# Patient Record
Sex: MALE | Race: WHITE | NOT HISPANIC OR LATINO | Employment: FULL TIME | URBAN - METROPOLITAN AREA
[De-identification: names, ages, dates, MRNs, and addresses within clinical notes are randomized per-mention and may not be internally consistent; named-entity substitution may affect disease eponyms.]

---

## 2018-04-26 ENCOUNTER — OFFICE VISIT (OUTPATIENT)
Dept: PRIMARY CARE CLINIC | Facility: CLINIC | Age: 38
End: 2018-04-26
Payer: COMMERCIAL

## 2018-04-26 VITALS
RESPIRATION RATE: 18 BRPM | HEIGHT: 67 IN | BODY MASS INDEX: 35.16 KG/M2 | SYSTOLIC BLOOD PRESSURE: 137 MMHG | TEMPERATURE: 98 F | HEART RATE: 89 BPM | DIASTOLIC BLOOD PRESSURE: 84 MMHG | OXYGEN SATURATION: 97 % | WEIGHT: 224 LBS

## 2018-04-26 DIAGNOSIS — Z13.6 ENCOUNTER FOR SCREENING FOR CARDIOVASCULAR DISORDERS: ICD-10-CM

## 2018-04-26 DIAGNOSIS — Z00.00 ANNUAL PHYSICAL EXAM: Primary | ICD-10-CM

## 2018-04-26 LAB
BILIRUB SERPL-MCNC: NORMAL MG/DL
BLOOD URINE, POC: NORMAL
COLOR, POC UA: YELLOW
EKG 12-LEAD: NORMAL
GLUCOSE UR QL STRIP: NORMAL
KETONES UR QL STRIP: NORMAL
LEUKOCYTE ESTERASE URINE, POC: NORMAL
NITRITE, POC UA: NORMAL
PH, POC UA: 6
PROTEIN, POC: NORMAL
SPECIFIC GRAVITY, POC UA: 1
UROBILINOGEN, POC UA: 4

## 2018-04-26 PROCEDURE — 81001 URINALYSIS AUTO W/SCOPE: CPT | Mod: S$GLB,,, | Performed by: FAMILY MEDICINE

## 2018-04-26 PROCEDURE — 99999 PR PBB SHADOW E&M-NEW PATIENT-LVL III: CPT | Mod: PBBFAC,,, | Performed by: FAMILY MEDICINE

## 2018-04-26 PROCEDURE — 93000 ELECTROCARDIOGRAM COMPLETE: CPT | Mod: S$GLB,,, | Performed by: FAMILY MEDICINE

## 2018-04-26 PROCEDURE — 99395 PREV VISIT EST AGE 18-39: CPT | Mod: 25,S$GLB,, | Performed by: FAMILY MEDICINE

## 2018-07-31 PROBLEM — E78.00 HYPERCHOLESTEROLEMIA: Status: ACTIVE | Noted: 2018-07-31

## 2020-01-16 NOTE — PROGRESS NOTES
"Subjective:       Patient ID: Ricardo De La Cruz is a 37 y.o. male.    Chief Complaint: Annual Exam    Here for annual exam, due for labs (last done ~10 years ago). Cholesterol was a little elevated last time it was checked.  Generally feeling OK. Drinks 6-12 beers per weekend on Friday and Saturday nights. Smokes marijuana ~4 days/week. No other illicit substances. No routine meds.      Review of Systems   Constitutional: Negative for chills, fatigue and fever.   HENT: Negative for congestion.    Eyes: Negative for visual disturbance.   Respiratory: Negative for cough and shortness of breath.    Cardiovascular: Negative for chest pain.   Gastrointestinal: Negative for abdominal pain, nausea and vomiting.   Genitourinary: Negative for difficulty urinating.   Musculoskeletal: Negative for arthralgias.   Skin: Negative for rash.   Neurological: Negative for dizziness.   Psychiatric/Behavioral: Negative for sleep disturbance.       Objective:      Vitals:    04/26/18 1633   BP: 137/84   BP Location: Left arm   Patient Position: Sitting   BP Method: Large (Automatic)   Pulse: 89   Resp: 18   Temp: 98 °F (36.7 °C)   TempSrc: Oral   SpO2: 97%   Weight: 101.6 kg (224 lb)   Height: 5' 7" (1.702 m)     Physical Exam   Constitutional: He is oriented to person, place, and time. He appears well-developed and well-nourished.   HENT:   Head: Normocephalic and atraumatic.   Mouth/Throat: Oropharynx is clear and moist.   Eyes: EOM are normal. Pupils are equal, round, and reactive to light.   Neck: Neck supple. No JVD present. Carotid bruit is not present.   Cardiovascular: Normal rate, regular rhythm and normal heart sounds.    Pulses:       Radial pulses are 2+ on the right side, and 2+ on the left side.   Pulmonary/Chest: Effort normal and breath sounds normal.   Abdominal: Soft. Bowel sounds are normal. There is no tenderness.   Musculoskeletal: He exhibits no edema.   Neurological: He is alert and oriented to person, place, and " 2020       Charlie Jacobo MD  [address] [place] [address]  VIA In Basket      Patient: Joyce Hanson   YOB: 1946   Date of Visit: 2020       Dear Dr. Jacobo:    Thank you for referring Joyce Hanson to me for evaluation. Below are my notes for this visit with her.    If you have questions, please do not hesitate to call me. I look forward to following your patient along with you.      Sincerely,        Osiel Hernandez MD        CC: No Recipients  Yesica XOCHITL CastellanosZarate  2020  3:34 PM  Signed  Insurance Auth/Order for sleep study faxed to [facility].    Osiel Hernandez MD  2020  8:54 AM  Sign when Signing Visit  Cardiology Clinic Note: Osiel Hernandez MD, MPH     Referred by:   Primary care physician: Charlie Jacobo MD     Consultation for Joyce Hanson  : 1946      Subjective:   Joyce Hanson is a 73 year old woman with past medical history significant for CAD s/p stent, prior silent MI, HLD, HTN, and COPD who returns for follow up.     Ms. Hanson was admitted to the ED (2019) for acute on chronic respiratory failure with hypoxia and acute COPD exacerbation. Her symptoms improved with inhaler. Her blood pressure was elevated at the time. She presented no symptoms of heart failure, and troponin levels were normal. Chest x-ray was clear and EKG was normal.     At her last visit (2019), she had been experiencing SOB with minimal activity, such as walking around her house, which had remained relatively stable. This was not associated with chest pain. Additionally, she noted some peripheral edema. She otherwise denied any other cardiovascular symptoms. We planned to start her on aspirin and resume metoprolol succinate. An echocardiogram and coronary CTA were ordered.     Since her visit, she underwent a TTE, which demonstrated mild to moderately thickened LV likely due to hypertension, elevated pulmonary artery  pressures in the setting of COPD, and mildly dilated IVC indicating some mild volume overload which correlated with the mild lower extremity edema seen on exam. I referred her to pulmonology for COPD and pulmonary hypertension and instructed her to discontinue HCTZ and start chlorthalidone 25 mg antonio for mild   volume overload and uncontrolled HTN.     Today she presents with continuing shortness of breath similar to baseline attributed to her COPD. She has not been very mobile lately but she states that she does become short of breath while walking around her house. She is currently on home oxygen. She reports that due to some confusion she has not started aspirin or followed up with pulmonology for her COPD. She otherwise denies chest pain/heaviness, peripheral edema, orthopnea, PND, rapid weight gain, syncope/near syncope, and palpitations. Additionally, she continues to smoke. She presented quite strong resistance to smoking cessation during our counseling session, but eventually agreed on 02/18/2020 as a quit date. Recently she has suffered the death of a friend and as a result has developed some depressive moods.    Review of Systems   Constitutional: Negative for fatigue and unexpected weight change.   HENT: Negative for nosebleeds.    Respiratory: Negative for cough; + shortness of breath.    Cardiovascular: Negative for chest pain, palpitations.   Gastrointestinal: Negative for abdominal distention, abdominal pain and blood in stool.   Endocrine: Negative for polyuria.   Genitourinary: Negative for hematuria.   Musculoskeletal: Negative for myalgias.   Skin: Negative for pallor.   Neurological: Negative for syncope and light-headedness.   Hematological: Does not bruise/bleed easily.   Otherwise complete ROS is negative.      Past Medical History:   Diagnosis Date   • Abnormal stress test    • Acute sinusitis    • CAD (coronary artery disease)    • Cervical radiculopathy    • COPD (chronic obstructive  time.   Skin: Skin is warm and dry.   Psychiatric: He has a normal mood and affect. His behavior is normal.   Nursing note and vitals reviewed.      Assessment:       1. Annual physical exam    2. Encounter for screening for cardiovascular disorders        Plan:       Annual physical exam  -     CBC auto differential; Future; Expected date: 04/30/2018  -     Comprehensive metabolic panel; Future; Expected date: 04/30/2018  -     Lipid panel; Future; Expected date: 04/30/2018  -     POCT EKG 12-LEAD (NOT FOR [facility] USE)  -     POCT urinalysis, dipstick or tablet reag    Encounter for screening for cardiovascular disorders  Comments:  EKG normal  Orders:  -     Lipid panel; Future; Expected date: 04/30/2018  -     POCT EKG 12-LEAD (NOT FOR [facility] USE)             pulmonary disease) (CMS/HCC)    • HLD (hyperlipidemia)    • Hypertension    • Hypokalemia    • Old MI (myocardial infarction)     Silent   • Ventricular hypokinesis         Past Surgical History:   Procedure Laterality Date   • Cardiac catherization     • Colonoscopy          Family History   Problem Relation Age of Onset   • Heart disease Neg Hx         Social History     Tobacco Use   • Smoking status: Current Every Day Smoker     Packs/day: 0.50     Types: Cigarettes     Start date: 1961   • Smokeless tobacco: Current User   Substance Use Topics   • Alcohol use: Not Currently     Frequency: Never     Comment: social   • Drug use: Never        ALLERGIES:   Allergen Reactions   • Penicillins Other (See Comments)     unknown     Current Outpatient Medications   Medication Sig   • chlorthalidone (THALITONE) 25 MG tablet Take 1 tablet by mouth daily.   • traMADol (ULTRAM) 50 MG tablet Take 1 tablet by mouth 2 times daily as needed for Pain. Medication last prescribed by Natasha Núñez CNP   • losartan (COZAAR) 50 MG tablet Take 1 tablet by mouth daily.   • metoPROLOL succinate (TOPROL-XL) 25 MG 24 hr tablet Take 1 tablet by mouth daily.   • rosuvastatin (CRESTOR) 40 MG tablet Take 1 tablet by mouth daily.   • gabapentin (NEURONTIN) 300 MG capsule Take 1 capsule by mouth 3 times daily.   • Respiratory Therapy Supplies (NEBULIZER/ADULT MASK) Kit To use with albuterol for COPD Code: J44.9   • albuterol (VENTOLIN) (2.5 MG/3ML) 0.083% nebulizer solution Take 3 mLs by nebulization every 6 hours as needed for Wheezing or Shortness of Breath.   • aspirin 81 MG tablet Take 1 tablet by mouth daily.   • nicotine polacrilex (NICOTINE MINI) 4 MG lozenge Place 1 lozenge inside cheek as needed for Smoking cessation.   • varenicline (CHANTIX STARTING MONTH PAK) 0.5 MG X 11 & 1 MG X 42 tablet Days 1 to 3: 0.5 mg once daily; Days 4 to 7: 0.5 mg twice daily; Day 8 through the end of week 12: 1 mg twice daily   • varenicline  (CHANTIX CONTINUING MONTH DEEP) 1 MG tablet Take 1 tablet by mouth 2 times daily. Day 1 to 3: 0.5 mg once daily; Day 4 to 7: 0.5 mg twice daily; Day 8 through week 12: 1 mg twice daily   • methylPREDNISolone (MEDROL, DEEP,) 4 MG tablet Take 1 tablet by mouth as directed. follow package directions   • furosemide (LASIX) 20 MG tablet TAKE 1 TABLET BY MOUTH DAILY AS NEEDED FOR LOWER EXTREMITY EDEMA     No current facility-administered medications for this visit.         Objective:     Physical Exam:   Visit Vitals  /69 (BP Location: LUE - Left upper extremity, Patient Position: Sitting, Cuff Size: Regular)   Pulse 79   Temp 98.5 °F (36.9 °C) (Tympanic)   Ht 5' 2.01\" (1.575 m)   Wt 82.3 kg (181 lb 6.4 oz)   SpO2 (!) 89%   BMI 33.17 kg/m²     Wt Readings from Last 4 Encounters:   01/16/20 82.3 kg (181 lb 6.4 oz)   11/26/19 85.6 kg (188 lb 11.4 oz)   11/18/19 85.4 kg (188 lb 4.4 oz)   09/03/19 83.1 kg (183 lb 3.2 oz)     General: NAD  Eyes:  No arcus; No xanthelasma;  No ptosis; pupils equal in size; no icterus  Neck: No JVD sitting; carotid normal amplitude; normal cervical ROM  Respiratory: Symmetric chest expansion, No crackles, wheezes or dullness  Cardiovascular:  Rhythm regular; Normal S1, S2 split normal; No gallops, rubs or murmurs; No carotid bruits; DP pulses 2+ bilaterally  GI: Not distended, non-tender, BS active; no flank dullness  MSK: Muscle mass as expected for age   Extremities: No varicose veins or BLE edema.    Neurologic:  Alert, no tremor  Mood: Euthymic  Skin: Intact     Labs:  CHOLESTEROL (mg/dL)   Date Value   11/18/2019 157     HDL (mg/dL)   Date Value   11/18/2019 56     CHOL/HDL (no units)   Date Value   11/18/2019 2.8     TRIGLYCERIDE (mg/dL)   Date Value   11/18/2019 113     CALCULATED LDL (mg/dL)   Date Value   11/18/2019 78        Hemoglobin A1C (%)   Date Value   11/26/2019 6.3 (H)        WBC (K/mcL)   Date Value   08/23/2019 5.7     RBC (mil/mcL)   Date Value   08/23/2019 6.17 (H)      HCT (%)   Date Value   08/23/2019 56.7 (H)     HGB (g/dL)   Date Value   08/23/2019 17.8 (H)     PLT (K/mcL)   Date Value   08/23/2019 230        Sodium (mmol/L)   Date Value   11/26/2019 144     Potassium (mmol/L)   Date Value   11/26/2019 3.6     Chloride (mmol/L)   Date Value   11/26/2019 102     Glucose (mg/dL)   Date Value   11/26/2019 77     CALCIUM (mg/dL)   Date Value   11/26/2019 9.3     Carbon Dioxide (mmol/L)   Date Value   11/26/2019 33 (H)     BUN (mg/dL)   Date Value   11/26/2019 8     Creatinine (mg/dL)   Date Value   11/26/2019 0.72        AST/SGOT (Units/L)   Date Value   11/26/2019 13     ALT/SGPT (Units/L)   Date Value   11/26/2019 13     No results found for: GGTP  ALK PHOSPHATASE (Units/L)   Date Value   11/26/2019 102     TOTAL BILIRUBIN (mg/dL)   Date Value   11/26/2019 0.6        Imaging:    Cardiac Catheterization 03/21/2018:    LM:   20%.  LAD:   Medium-sized vessel.  Mild to moderate diffuse disease.  One large diagonal branch system.  Positive collaterals via septals to the distal RCA/PDA.    Cx:   Nondominant.  Giving rise to to OM system.  This vessels have mild to moderate nonobstructive disease.     RCA:   100% midportion.  Small vessel.   of the distal RCA PDA and PL branch.  The distal PDA and PL branches seen via collaterals through the septal perforators.  This distal system is small in size with mild to moderate diffuse disease.      Lv Function:   Not done.  Normal LVEDP.    Nuclear Stress Test 07/10/2018:  CONCLUSION  Abnormal myocardial perfusion scan suggestive of infarction of the inferobasal wall without evidence of reversible ischemia. Ejection fraction is low normal.     TTE 03/28/2019:  STUDY CONCLUSIONS  SUMMARY:  1. Left ventricle: The cavity size is normal. Wall thickness is at the     upper limits of normal. Systolic function is normal. The estimated ejection fraction is 70%, by biplane method of disks.  2. Right ventricle: The cavity size is normal. Wall  thickness is normal. Systolic function is normal.  3. Aortic valve: Transvalvular velocity is within the normal range. There is no stenosis. No regurgitation.  4. Mitral valve: The leaflets are mildly calcified. Transvalvular velocity is within the normal range. There is no evidence for stenosis. No regurgitation.  5. Tricuspid valve: No regurgitation.    EKG 1/28/2019:  Sinus rhythm.     TTE 11/21/2019:  STUDY CONCLUSIONS  SUMMARY:  1. Left ventricle: The cavity size is mildly reduced. Wall thickness is     mildly to moderately increased. Systolic function is normal. The     estimated ejection fraction is 55%, by visual assessment. Doppler     parameters are consistent with abnormal left ventricular relaxation     (grade 1 diastolic dysfunction).  2. Aortic root: The aortic root is mildly dilated and mildly calcified.  3. Right ventricle: The cavity size is mildly dilated. Wall thickness is     normal. Systolic function is mildly reduced. Systolic pressure is     moderately increased. The estimated peak pressure is 65mm Hg.  4. Inferior vena cava: The vessel is mildly dilated. The respirophasic     diameter changes are in the normal range (greater than or equal to     50%).  5. Pericardium, extracardiac: A trivial pericardial effusion is     identified. There is no evidence of hemodynamic compromise.    Coronary CTA 12/11/2019:  IMPRESSION:  1. Coronary artery disease with total RCA occlusion with collaterals from LAD and mild-moderate   LCA disease. An FFR assessment was sent and will be reported separately.  2. Coronary artery calcium score of 1287, which represents the 95th percentile adjusted for age,   gender and ethnicity.  3. CAD RADS 5 - preventative therapies should be implemented  4. LVEF of 51%.     The protocol was designed for imaging cardiovascular structures and is suboptimal for imaging of   other structures and organs. The lungs, mediastinum and other chest structures would be reported   on  separately by radiology.    Impression/Report/Plan:  In summary, Ms. Hanson is a 73 year old female with past medical history significant for CAD s/p stent, prior silent MI, HLD, HTN, and COPD who returns for follow up.    #1 CAD as above  #2 Dyspnea on exertion  #3 COPD  Although she has known  of the mid RCA since 2018, she continues to be free of angina. Her COPD represents a competing cause of dyspnea.   - Start aspirin 81 mg daily.   - Continue metoprolol succinate 25 mg daily.   - Continue rosuvastatin 40 mg daily.     #4 Pulmonary HTN  #5 Daytime sleepiness   This is likely secondary to COPD.  - Sleep study recommended.      #6 HLD, controlled  Cholesterol levels from last visit controlled.   - Continue rosuvastatin 40 mg nightly.   - Recheck fasting lipids.     #7 Peripheral edema  Now resolved, and recent echocardiogram did not indicate a clear cause.     #8 HTN  Blood pressure was controlled at today's visit.   - Continue metoprolol succinate 25 mg daily.  - Continue losartan 50 mg daily.   - Continue chlorthalidone 25 mg daily.     #9 Nicotine dependence   Ms. Hanson remains pre-contemplative about smoking cessation, but she plans to quit on February 17.  - Advised to quit smoking entirely.   - Arranged for follow-up by asking the patient to call the [place] Tobacco Quit Line at [phone]-QUIT-NOW.   -- Nicotine replacement lozenges prescribed for breakthrough cravings and to be used per  labeling.   - Chantix will be prescribed when she is ready to quit.   - Referred to [facility] transition support team.     Follow up: Follow up with me in 2 months.     Osiel Hernandez MD, MPH  Interventional Cardiology  Please contact via Epic or [employer]er    Diagnoses Today:   1. Coronary artery disease involving native coronary artery of native heart without angina pectoris    2. CARPIO (dyspnea on exertion)    3. Chronic obstructive pulmonary disease, unspecified COPD type (CMS/HCC)    4. Pulmonary HTN (CMS/HCC)     5. Daytime sleepiness    6. Pure hypercholesterolemia    7. Lower leg edema    8. Benign essential hypertension    9. Cigarette nicotine dependence with nicotine-induced disorder       On 1/16/2020, Nikunj LYONS scribed the services personally performed by Osiel Hernandez MD.     ELOY, Osiel Hernandez, have reviewed and edited the scribe's note and agree with her documentation for this patient encounter.

## 2020-02-20 ENCOUNTER — OFFICE VISIT (OUTPATIENT)
Dept: SURGERY | Facility: CLINIC | Age: 40
End: 2020-02-20
Payer: COMMERCIAL

## 2020-02-20 VITALS — HEIGHT: 67 IN | WEIGHT: 219 LBS | BODY MASS INDEX: 34.37 KG/M2

## 2020-02-20 DIAGNOSIS — K40.90 RIGHT INGUINAL HERNIA: ICD-10-CM

## 2020-02-20 PROCEDURE — 99999 PR PBB SHADOW E&M-EST. PATIENT-LVL II: CPT | Mod: PBBFAC,,, | Performed by: SURGERY

## 2020-02-20 PROCEDURE — 3008F BODY MASS INDEX DOCD: CPT | Mod: CPTII,S$GLB,, | Performed by: SURGERY

## 2020-02-20 PROCEDURE — 99999 PR PBB SHADOW E&M-EST. PATIENT-LVL II: ICD-10-PCS | Mod: PBBFAC,,, | Performed by: SURGERY

## 2020-02-20 PROCEDURE — 99203 OFFICE O/P NEW LOW 30 MIN: CPT | Mod: S$GLB,,, | Performed by: SURGERY

## 2020-02-20 PROCEDURE — 3008F PR BODY MASS INDEX (BMI) DOCUMENTED: ICD-10-PCS | Mod: CPTII,S$GLB,, | Performed by: SURGERY

## 2020-02-20 PROCEDURE — 99203 PR OFFICE/OUTPT VISIT, NEW, LEVL III, 30-44 MIN: ICD-10-PCS | Mod: S$GLB,,, | Performed by: SURGERY

## 2020-02-20 NOTE — Clinical Note
February 20, 2020      Jovan Quintanilla MD  [address]  [address]  [facility] [address]           [facility]  [address]  [place] [address]  Phone: [phone]          Patient: Ricardo De La Cruz   MR Number: [id number]   YOB: 1980   Date of Visit: 2/20/2020       Dear Dr. Jovan Quintanilla:    Thank you for referring Ricardo De La Cruz to me for evaluation. Attached you will find relevant portions of my assessment and plan of care.    If you have questions, please do not hesitate to call me. I look forward to following Ricardo De La Cruz along with you.    Sincerely,    Finn Lainez MD    Enclosure  CC:  No Recipients    If you would like to receive this communication electronically, please contact [email or web address] or [phone] to request more information on [employer] Link access.    For providers and/or their staff who would like to refer a patient to [facility], please contact us through our one-stop-shop provider referral line, [facility], at [phone].    If you feel you have received this communication in error or would no longer like to receive these types of communications, please e-mail [email or web address]

## 2020-02-20 NOTE — LETTER
[facility]  [address]  [facility] [address]  Phone: [phone] February 20, 2020      Jovan Quintanilla MD  [address]  [facility] [address]    Patient: Ricardo De La Cruz   MR Number: [id number]   YOB: 1980   Date of Visit: 2/20/2020     Dear Dr. Quintanilla:    Thank you for referring Ricardo De La Cruz to me for evaluation. Attached you will find relevant portions of my assessment and plan of care.    Mr. De La Cruz is a 39-year-old male with right inguinal hernia.  He noticed swelling in the right groin a few months ago.  He is having more sensitivity with time.  He is a .      On physical examination, small to moderate right inguinal hernia and small left inguinal hernia.  Testes normal. We will plan for laparoscopic bilateral inguinal hernia repair with mesh.    If you have questions, please do not hesitate to call me. I look forward to following Ricardo De La Cruz along with you.    Sincerely,      Finn Lainez MD  Professor, [employer]  Section Head, General Surgery  [facility]    WSR/afw

## 2020-02-21 NOTE — PROGRESS NOTES
"History & Physical    SUBJECTIVE:     History of Present Illness:  Patient is a 39 y.o. male presents with Right inguinal hernia. Patient first noticed feeling swelling in his scrotum. Upon an examination of his wife who is a nurse, it was believed there was an inguinal hernia. The patient does not complain of pain but does feel discomfort and increased sensitivity in the scrotum associated with the hernia. The patient had a vasectomy 2 years ago.    Chief Complaint   Patient presents with    Hernia     [facility]       Review of patient's allergies indicates:   Allergen Reactions    Meperidine Nausea And Vomiting       No current outpatient medications on file.     No current facility-administered medications for this visit.        No past medical history on file.  No past surgical history on file.  Family History   Problem Relation Age of Onset    Cancer Maternal Grandfather      Social History     Tobacco Use    Smoking status: Never Smoker    Smokeless tobacco: Never Used   Substance Use Topics    Alcohol use: Yes     Comment: social     Drug use: Yes     Types: Marijuana        Review of Systems:  Review of Systems   Constitutional: Negative for activity change, appetite change, chills, diaphoresis, fatigue, fever and unexpected weight change.   HENT: Negative for congestion, dental problem, drooling, ear discharge, ear pain, facial swelling, hearing loss, mouth sores, nosebleeds, postnasal drip, rhinorrhea, sinus pressure, sinus pain, sneezing, sore throat, tinnitus, trouble swallowing and voice change.    Eyes: Negative.    Respiratory: Negative.    Cardiovascular: Negative.    Gastrointestinal: Negative.    Endocrine: Negative.    Genitourinary: Negative.    Musculoskeletal: Negative.    Skin: Negative.    Allergic/Immunologic: Negative.    Neurological: Negative.    Hematological: Negative.    Psychiatric/Behavioral: Negative.        OBJECTIVE:     Vital Signs (Most Recent)     5' 7" (1.702 m)  99.3 kg (219 " lb)     Physical Exam:  Physical Exam   Constitutional: He appears well-developed and well-nourished.   HENT:   Head: Normocephalic and atraumatic.   Right Ear: External ear normal.   Left Ear: External ear normal.   Nose: Nose normal.   Mouth/Throat: Oropharynx is clear and moist.   Eyes: Pupils are equal, round, and reactive to light. Conjunctivae and EOM are normal.   Neck: Normal range of motion. Neck supple.   Cardiovascular: Normal rate, regular rhythm, normal heart sounds and intact distal pulses.   Pulmonary/Chest: Effort normal and breath sounds normal.   Abdominal: Soft. Bowel sounds are normal. A hernia is present. Hernia confirmed positive in the right inguinal area.       Genitourinary: Rectum normal, prostate normal and penis normal.   Musculoskeletal: Normal range of motion.   Neurological: He is alert.   Skin: Skin is warm and dry. Capillary refill takes less than 2 seconds.   Psychiatric: He has a normal mood and affect. His behavior is normal. Judgment and thought content normal.       Laboratory  No labs to review    Diagnostic Results:  No diagnostic results to review    ASSESSMENT/PLAN:     Small to moderate right inguinal hernia  Small left inguinal hernia    PLAN:Plan     Schedule for Lap bilateral inguinal hernia repair

## 2020-02-21 NOTE — PROGRESS NOTES
I have seen the patient, reviewed the Student's history and physical, assessment and plan. I have personally interviewed and examined the patient at bedside and: agree with the findings.     40 y/o with rih.  He noticed swelling in the right groin a few months ago.  He is having more sensitivity with time.  He is a .  On PE small to moderate rih and small lih.  Testes normal.  For lap bih with mesh.

## 2020-10-05 ENCOUNTER — PATIENT MESSAGE (OUTPATIENT)
Dept: ADMINISTRATIVE | Facility: HOSPITAL | Age: 40
End: 2020-10-05

## 2020-11-04 DIAGNOSIS — Z01.818 PREOP EXAMINATION: ICD-10-CM

## 2020-12-31 ENCOUNTER — ANESTHESIA EVENT (OUTPATIENT)
Dept: SURGERY | Facility: HOSPITAL | Age: 40
End: 2020-12-31
Payer: COMMERCIAL

## 2020-12-31 ENCOUNTER — TELEPHONE (OUTPATIENT)
Dept: SURGERY | Facility: CLINIC | Age: 40
End: 2020-12-31

## 2020-12-31 RX ORDER — FENTANYL CITRATE 50 UG/ML
25 INJECTION, SOLUTION INTRAMUSCULAR; INTRAVENOUS EVERY 5 MIN PRN
Status: CANCELLED | OUTPATIENT
Start: 2020-12-31

## 2021-01-04 ENCOUNTER — PATIENT MESSAGE (OUTPATIENT)
Dept: ADMINISTRATIVE | Facility: HOSPITAL | Age: 41
End: 2021-01-04

## 2021-01-04 ENCOUNTER — HOSPITAL ENCOUNTER (OUTPATIENT)
Facility: HOSPITAL | Age: 41
Discharge: HOME | End: 2021-01-04
Attending: SURGERY | Admitting: SURGERY
Payer: COMMERCIAL

## 2021-01-04 ENCOUNTER — ANESTHESIA (OUTPATIENT)
Dept: SURGERY | Facility: HOSPITAL | Age: 41
End: 2021-01-04
Payer: COMMERCIAL

## 2021-01-04 VITALS
RESPIRATION RATE: 13 BRPM | DIASTOLIC BLOOD PRESSURE: 66 MMHG | OXYGEN SATURATION: 99 % | HEART RATE: 71 BPM | TEMPERATURE: 98 F | WEIGHT: 210 LBS | BODY MASS INDEX: 32.96 KG/M2 | SYSTOLIC BLOOD PRESSURE: 109 MMHG | HEIGHT: 67 IN

## 2021-01-04 DIAGNOSIS — K40.90 RIGHT INGUINAL HERNIA: Primary | ICD-10-CM

## 2021-01-04 DIAGNOSIS — K40.90 INGUINAL HERNIA: ICD-10-CM

## 2021-01-04 PROBLEM — K40.20 NON-RECURRENT BILATERAL INGUINAL HERNIA WITHOUT OBSTRUCTION OR GANGRENE: Status: ACTIVE | Noted: 2020-02-20

## 2021-01-04 PROCEDURE — 63600175 PHARM REV CODE 636 W HCPCS: Performed by: NURSE ANESTHETIST, CERTIFIED REGISTERED

## 2021-01-04 PROCEDURE — C1781 MESH (IMPLANTABLE): HCPCS | Performed by: SURGERY

## 2021-01-04 PROCEDURE — 49650 PR LAP,INGUINAL HERNIA REPR,INITIAL: ICD-10-PCS | Mod: 50,,, | Performed by: SURGERY

## 2021-01-04 PROCEDURE — 71000015 HC POSTOP RECOV 1ST HR: Performed by: SURGERY

## 2021-01-04 PROCEDURE — 25000003 PHARM REV CODE 250: Performed by: NURSE ANESTHETIST, CERTIFIED REGISTERED

## 2021-01-04 PROCEDURE — D9220A PRA ANESTHESIA: Mod: CRNA,,, | Performed by: NURSE ANESTHETIST, CERTIFIED REGISTERED

## 2021-01-04 PROCEDURE — 71000044 HC DOSC ROUTINE RECOVERY FIRST HOUR: Performed by: SURGERY

## 2021-01-04 PROCEDURE — 37000009 HC ANESTHESIA EA ADD 15 MINS: Performed by: SURGERY

## 2021-01-04 PROCEDURE — 25000003 PHARM REV CODE 250: Performed by: SURGERY

## 2021-01-04 PROCEDURE — 76942 ECHO GUIDE FOR BIOPSY: CPT | Performed by: ANESTHESIOLOGY

## 2021-01-04 PROCEDURE — 71000016 HC POSTOP RECOV ADDL HR: Performed by: SURGERY

## 2021-01-04 PROCEDURE — 63600175 PHARM REV CODE 636 W HCPCS: Performed by: ANESTHESIOLOGY

## 2021-01-04 PROCEDURE — 36000708 HC OR TIME LEV III 1ST 15 MIN: Performed by: SURGERY

## 2021-01-04 PROCEDURE — 25000003 PHARM REV CODE 250: Performed by: ANESTHESIOLOGY

## 2021-01-04 PROCEDURE — D9220A PRA ANESTHESIA: ICD-10-PCS | Mod: ANES,,, | Performed by: ANESTHESIOLOGY

## 2021-01-04 PROCEDURE — 63600175 PHARM REV CODE 636 W HCPCS: Performed by: STUDENT IN AN ORGANIZED HEALTH CARE EDUCATION/TRAINING PROGRAM

## 2021-01-04 PROCEDURE — 76942 ECHO GUIDE FOR BIOPSY: CPT | Mod: 26,,, | Performed by: ANESTHESIOLOGY

## 2021-01-04 PROCEDURE — 49650 LAP ING HERNIA REPAIR INIT: CPT | Mod: 50,,, | Performed by: SURGERY

## 2021-01-04 PROCEDURE — 27201423 OPTIME MED/SURG SUP & DEVICES STERILE SUPPLY: Performed by: SURGERY

## 2021-01-04 PROCEDURE — D9220A PRA ANESTHESIA: Mod: ANES,,, | Performed by: ANESTHESIOLOGY

## 2021-01-04 PROCEDURE — 76942 PR U/S GUIDANCE FOR NEEDLE GUIDANCE: ICD-10-PCS | Mod: 26,,, | Performed by: ANESTHESIOLOGY

## 2021-01-04 PROCEDURE — 36000709 HC OR TIME LEV III EA ADD 15 MIN: Performed by: SURGERY

## 2021-01-04 PROCEDURE — 64450 PR NERVE BLOCK INJ, ANES/STEROID, OTHER PERIPHERAL: ICD-10-PCS | Mod: 59,,, | Performed by: ANESTHESIOLOGY

## 2021-01-04 PROCEDURE — S0020 INJECTION, BUPIVICAINE HYDRO: HCPCS | Performed by: ANESTHESIOLOGY

## 2021-01-04 PROCEDURE — 63600175 PHARM REV CODE 636 W HCPCS: Performed by: SURGERY

## 2021-01-04 PROCEDURE — 64450 NJX AA&/STRD OTHER PN/BRANCH: CPT | Mod: 59,,, | Performed by: ANESTHESIOLOGY

## 2021-01-04 PROCEDURE — D9220A PRA ANESTHESIA: ICD-10-PCS | Mod: CRNA,,, | Performed by: NURSE ANESTHETIST, CERTIFIED REGISTERED

## 2021-01-04 PROCEDURE — 37000008 HC ANESTHESIA 1ST 15 MINUTES: Performed by: SURGERY

## 2021-01-04 PROCEDURE — 25000003 PHARM REV CODE 250: Performed by: STUDENT IN AN ORGANIZED HEALTH CARE EDUCATION/TRAINING PROGRAM

## 2021-01-04 RX ORDER — PROPOFOL 10 MG/ML
VIAL (ML) INTRAVENOUS
Status: DISCONTINUED | OUTPATIENT
Start: 2021-01-04 | End: 2021-01-04

## 2021-01-04 RX ORDER — NEOSTIGMINE METHYLSULFATE 0.5 MG/ML
INJECTION, SOLUTION INTRAVENOUS
Status: DISCONTINUED | OUTPATIENT
Start: 2021-01-04 | End: 2021-01-04

## 2021-01-04 RX ORDER — SODIUM CHLORIDE 9 MG/ML
INJECTION, SOLUTION INTRAVENOUS CONTINUOUS
Status: DISCONTINUED | OUTPATIENT
Start: 2021-01-04 | End: 2021-01-04 | Stop reason: HOSPADM

## 2021-01-04 RX ORDER — FENTANYL CITRATE 50 UG/ML
INJECTION, SOLUTION INTRAMUSCULAR; INTRAVENOUS
Status: DISCONTINUED | OUTPATIENT
Start: 2021-01-04 | End: 2021-01-04

## 2021-01-04 RX ORDER — BUPIVACAINE HYDROCHLORIDE 2.5 MG/ML
INJECTION, SOLUTION EPIDURAL; INFILTRATION; INTRACAUDAL
Status: DISCONTINUED | OUTPATIENT
Start: 2021-01-04 | End: 2021-01-04 | Stop reason: HOSPADM

## 2021-01-04 RX ORDER — ONDANSETRON 2 MG/ML
4 INJECTION INTRAMUSCULAR; INTRAVENOUS DAILY PRN
Status: DISCONTINUED | OUTPATIENT
Start: 2021-01-04 | End: 2021-01-04 | Stop reason: HOSPADM

## 2021-01-04 RX ORDER — OXYCODONE AND ACETAMINOPHEN 5; 300 MG/1; MG/1
1 TABLET ORAL EVERY 4 HOURS PRN
Qty: 20 TABLET | Refills: 0 | Status: SHIPPED | OUTPATIENT
Start: 2021-01-04 | End: 2021-01-04 | Stop reason: HOSPADM

## 2021-01-04 RX ORDER — FENTANYL CITRATE 50 UG/ML
25 INJECTION, SOLUTION INTRAMUSCULAR; INTRAVENOUS EVERY 5 MIN PRN
Status: DISCONTINUED | OUTPATIENT
Start: 2021-01-04 | End: 2021-01-04 | Stop reason: HOSPADM

## 2021-01-04 RX ORDER — CEFAZOLIN SODIUM 1 G/3ML
2 INJECTION, POWDER, FOR SOLUTION INTRAMUSCULAR; INTRAVENOUS
Status: COMPLETED | OUTPATIENT
Start: 2021-01-04 | End: 2021-01-04

## 2021-01-04 RX ORDER — MIDAZOLAM HYDROCHLORIDE 1 MG/ML
0.5 INJECTION INTRAMUSCULAR; INTRAVENOUS
Status: DISCONTINUED | OUTPATIENT
Start: 2021-01-04 | End: 2021-01-04 | Stop reason: HOSPADM

## 2021-01-04 RX ORDER — OXYCODONE HYDROCHLORIDE 5 MG/1
5 TABLET ORAL EVERY 4 HOURS PRN
Status: DISCONTINUED | OUTPATIENT
Start: 2021-01-04 | End: 2021-01-04 | Stop reason: HOSPADM

## 2021-01-04 RX ORDER — LIDOCAINE HYDROCHLORIDE 10 MG/ML
1 INJECTION, SOLUTION EPIDURAL; INFILTRATION; INTRACAUDAL; PERINEURAL ONCE
Status: COMPLETED | OUTPATIENT
Start: 2021-01-04 | End: 2021-01-04

## 2021-01-04 RX ORDER — LIDOCAINE HYDROCHLORIDE 20 MG/ML
INJECTION INTRAVENOUS
Status: DISCONTINUED | OUTPATIENT
Start: 2021-01-04 | End: 2021-01-04

## 2021-01-04 RX ORDER — ROCURONIUM BROMIDE 10 MG/ML
INJECTION, SOLUTION INTRAVENOUS
Status: DISCONTINUED | OUTPATIENT
Start: 2021-01-04 | End: 2021-01-04

## 2021-01-04 RX ORDER — DEXAMETHASONE SODIUM PHOSPHATE 4 MG/ML
INJECTION, SOLUTION INTRA-ARTICULAR; INTRALESIONAL; INTRAMUSCULAR; INTRAVENOUS; SOFT TISSUE
Status: DISCONTINUED | OUTPATIENT
Start: 2021-01-04 | End: 2021-01-04

## 2021-01-04 RX ORDER — SODIUM CHLORIDE 0.9 % (FLUSH) 0.9 %
3 SYRINGE (ML) INJECTION
Status: DISCONTINUED | OUTPATIENT
Start: 2021-01-04 | End: 2021-01-04 | Stop reason: HOSPADM

## 2021-01-04 RX ORDER — BUPIVACAINE HYDROCHLORIDE 7.5 MG/ML
INJECTION, SOLUTION EPIDURAL; RETROBULBAR
Status: COMPLETED | OUTPATIENT
Start: 2021-01-04 | End: 2021-01-04

## 2021-01-04 RX ORDER — HYDROMORPHONE HYDROCHLORIDE 1 MG/ML
0.2 INJECTION, SOLUTION INTRAMUSCULAR; INTRAVENOUS; SUBCUTANEOUS EVERY 5 MIN PRN
Status: DISCONTINUED | OUTPATIENT
Start: 2021-01-04 | End: 2021-01-04 | Stop reason: HOSPADM

## 2021-01-04 RX ORDER — OXYCODONE AND ACETAMINOPHEN 5; 325 MG/1; MG/1
1 TABLET ORAL EVERY 4 HOURS PRN
Qty: 20 TABLET | Refills: 0 | Status: SHIPPED | OUTPATIENT
Start: 2021-01-04 | End: 2021-03-10

## 2021-01-04 RX ORDER — ONDANSETRON 2 MG/ML
INJECTION INTRAMUSCULAR; INTRAVENOUS
Status: DISCONTINUED | OUTPATIENT
Start: 2021-01-04 | End: 2021-01-04

## 2021-01-04 RX ORDER — DEXMEDETOMIDINE HYDROCHLORIDE 100 UG/ML
INJECTION, SOLUTION INTRAVENOUS
Status: DISCONTINUED | OUTPATIENT
Start: 2021-01-04 | End: 2021-01-04

## 2021-01-04 RX ADMIN — BUPIVACAINE HYDROCHLORIDE 50 ML: 7.5 INJECTION, SOLUTION EPIDURAL; RETROBULBAR at 06:01

## 2021-01-04 RX ADMIN — PROPOFOL 200 MG: 10 INJECTION, EMULSION INTRAVENOUS at 07:01

## 2021-01-04 RX ADMIN — PROPOFOL 100 MG: 10 INJECTION, EMULSION INTRAVENOUS at 07:01

## 2021-01-04 RX ADMIN — CEFAZOLIN 2 G: 330 INJECTION, POWDER, FOR SOLUTION INTRAMUSCULAR; INTRAVENOUS at 07:01

## 2021-01-04 RX ADMIN — ROCURONIUM BROMIDE 50 MG: 10 INJECTION, SOLUTION INTRAVENOUS at 07:01

## 2021-01-04 RX ADMIN — FENTANYL CITRATE 50 MCG: 50 INJECTION, SOLUTION INTRAMUSCULAR; INTRAVENOUS at 08:01

## 2021-01-04 RX ADMIN — ONDANSETRON 4 MG: 2 INJECTION, SOLUTION INTRAMUSCULAR; INTRAVENOUS at 08:01

## 2021-01-04 RX ADMIN — LIDOCAINE HYDROCHLORIDE 100 MG: 20 INJECTION, SOLUTION INTRAVENOUS at 07:01

## 2021-01-04 RX ADMIN — LIDOCAINE HYDROCHLORIDE 0.1 MG: 10 INJECTION, SOLUTION EPIDURAL; INFILTRATION; INTRACAUDAL at 05:01

## 2021-01-04 RX ADMIN — PROPOFOL 50 MG: 10 INJECTION, EMULSION INTRAVENOUS at 07:01

## 2021-01-04 RX ADMIN — DEXMEDETOMIDINE HYDROCHLORIDE 8 MCG: 100 INJECTION, SOLUTION, CONCENTRATE INTRAVENOUS at 07:01

## 2021-01-04 RX ADMIN — FENTANYL CITRATE 100 MCG: 50 INJECTION INTRAMUSCULAR; INTRAVENOUS at 06:01

## 2021-01-04 RX ADMIN — DEXAMETHASONE SODIUM PHOSPHATE 12 MG: 4 INJECTION, SOLUTION INTRAMUSCULAR; INTRAVENOUS at 08:01

## 2021-01-04 RX ADMIN — ROCURONIUM BROMIDE 20 MG: 10 INJECTION, SOLUTION INTRAVENOUS at 07:01

## 2021-01-04 RX ADMIN — FENTANYL CITRATE 100 MCG: 50 INJECTION, SOLUTION INTRAMUSCULAR; INTRAVENOUS at 07:01

## 2021-01-04 RX ADMIN — NEOSTIGMINE METHYLSULFATE 5 MG: 0.5 INJECTION INTRAVENOUS at 08:01

## 2021-01-04 RX ADMIN — DEXAMETHASONE SODIUM PHOSPHATE 8 MG: 4 INJECTION, SOLUTION INTRAMUSCULAR; INTRAVENOUS at 07:01

## 2021-01-04 RX ADMIN — OXYCODONE HYDROCHLORIDE 5 MG: 5 TABLET ORAL at 08:01

## 2021-01-04 RX ADMIN — SODIUM CHLORIDE 70 ML/HR: 0.9 INJECTION, SOLUTION INTRAVENOUS at 05:01

## 2021-01-04 RX ADMIN — MIDAZOLAM 2 MG: 1 INJECTION INTRAMUSCULAR; INTRAVENOUS at 06:01

## 2021-01-05 ENCOUNTER — DOCUMENTATION ONLY (OUTPATIENT)
Dept: SURGERY | Facility: CLINIC | Age: 41
End: 2021-01-05

## 2021-01-14 ENCOUNTER — OFFICE VISIT (OUTPATIENT)
Dept: SURGERY | Facility: CLINIC | Age: 41
End: 2021-01-14
Payer: COMMERCIAL

## 2021-01-14 VITALS
SYSTOLIC BLOOD PRESSURE: 150 MMHG | WEIGHT: 219 LBS | HEIGHT: 67 IN | DIASTOLIC BLOOD PRESSURE: 89 MMHG | HEART RATE: 93 BPM | BODY MASS INDEX: 34.37 KG/M2

## 2021-01-14 DIAGNOSIS — Z09 POSTOP CHECK: Primary | ICD-10-CM

## 2021-01-14 PROBLEM — K40.90 INGUINAL HERNIA: Status: RESOLVED | Noted: 2021-01-04 | Resolved: 2021-01-14

## 2021-01-14 PROBLEM — K40.20 NON-RECURRENT BILATERAL INGUINAL HERNIA WITHOUT OBSTRUCTION OR GANGRENE: Status: RESOLVED | Noted: 2020-02-20 | Resolved: 2021-01-14

## 2021-01-14 PROCEDURE — 3008F PR BODY MASS INDEX (BMI) DOCUMENTED: ICD-10-PCS | Mod: CPTII,S$GLB,, | Performed by: SURGERY

## 2021-01-14 PROCEDURE — 1125F AMNT PAIN NOTED PAIN PRSNT: CPT | Mod: S$GLB,,, | Performed by: SURGERY

## 2021-01-14 PROCEDURE — 3008F BODY MASS INDEX DOCD: CPT | Mod: CPTII,S$GLB,, | Performed by: SURGERY

## 2021-01-14 PROCEDURE — 1125F PR PAIN SEVERITY QUANTIFIED, PAIN PRESENT: ICD-10-PCS | Mod: S$GLB,,, | Performed by: SURGERY

## 2021-01-14 PROCEDURE — 99024 POSTOP FOLLOW-UP VISIT: CPT | Mod: S$GLB,,, | Performed by: SURGERY

## 2021-01-14 PROCEDURE — 99999 PR PBB SHADOW E&M-EST. PATIENT-LVL III: CPT | Mod: PBBFAC,,, | Performed by: SURGERY

## 2021-01-14 PROCEDURE — 99999 PR PBB SHADOW E&M-EST. PATIENT-LVL III: ICD-10-PCS | Mod: PBBFAC,,, | Performed by: SURGERY

## 2021-01-14 PROCEDURE — 99024 PR POST-OP FOLLOW-UP VISIT: ICD-10-PCS | Mod: S$GLB,,, | Performed by: SURGERY

## 2021-03-09 ENCOUNTER — TELEPHONE (OUTPATIENT)
Dept: PRIMARY CARE CLINIC | Facility: CLINIC | Age: 41
End: 2021-03-09

## 2021-03-10 ENCOUNTER — OFFICE VISIT (OUTPATIENT)
Dept: PRIMARY CARE CLINIC | Facility: CLINIC | Age: 41
End: 2021-03-10
Payer: COMMERCIAL

## 2021-03-10 VITALS
BODY MASS INDEX: 35.09 KG/M2 | TEMPERATURE: 97 F | HEIGHT: 67 IN | DIASTOLIC BLOOD PRESSURE: 72 MMHG | OXYGEN SATURATION: 97 % | SYSTOLIC BLOOD PRESSURE: 124 MMHG | HEART RATE: 98 BPM | WEIGHT: 223.56 LBS | RESPIRATION RATE: 18 BRPM

## 2021-03-10 DIAGNOSIS — Z11.59 NEED FOR HEPATITIS C SCREENING TEST: ICD-10-CM

## 2021-03-10 DIAGNOSIS — Z23 NEED FOR VACCINATION: ICD-10-CM

## 2021-03-10 DIAGNOSIS — Z11.4 SCREENING FOR HIV (HUMAN IMMUNODEFICIENCY VIRUS): ICD-10-CM

## 2021-03-10 DIAGNOSIS — Z00.00 ANNUAL PHYSICAL EXAM: Primary | ICD-10-CM

## 2021-03-10 DIAGNOSIS — E78.5 HYPERLIPIDEMIA, UNSPECIFIED HYPERLIPIDEMIA TYPE: ICD-10-CM

## 2021-03-10 PROCEDURE — 3008F PR BODY MASS INDEX (BMI) DOCUMENTED: ICD-10-PCS | Mod: CPTII,S$GLB,, | Performed by: FAMILY MEDICINE

## 2021-03-10 PROCEDURE — 99999 PR PBB SHADOW E&M-EST. PATIENT-LVL III: ICD-10-PCS | Mod: PBBFAC,,, | Performed by: FAMILY MEDICINE

## 2021-03-10 PROCEDURE — 90471 IMMUNIZATION ADMIN: CPT | Mod: S$GLB,,, | Performed by: FAMILY MEDICINE

## 2021-03-10 PROCEDURE — 99395 PR PREVENTIVE VISIT,EST,18-39: ICD-10-PCS | Mod: 25,S$GLB,, | Performed by: FAMILY MEDICINE

## 2021-03-10 PROCEDURE — 3008F BODY MASS INDEX DOCD: CPT | Mod: CPTII,S$GLB,, | Performed by: FAMILY MEDICINE

## 2021-03-10 PROCEDURE — 90471 TDAP VACCINE GREATER THAN OR EQUAL TO 7YO IM: ICD-10-PCS | Mod: S$GLB,,, | Performed by: FAMILY MEDICINE

## 2021-03-10 PROCEDURE — 99999 PR PBB SHADOW E&M-EST. PATIENT-LVL III: CPT | Mod: PBBFAC,,, | Performed by: FAMILY MEDICINE

## 2021-03-10 PROCEDURE — 99395 PREV VISIT EST AGE 18-39: CPT | Mod: 25,S$GLB,, | Performed by: FAMILY MEDICINE

## 2021-03-10 PROCEDURE — 1126F AMNT PAIN NOTED NONE PRSNT: CPT | Mod: S$GLB,,, | Performed by: FAMILY MEDICINE

## 2021-03-10 PROCEDURE — 90715 TDAP VACCINE GREATER THAN OR EQUAL TO 7YO IM: ICD-10-PCS | Mod: S$GLB,,, | Performed by: FAMILY MEDICINE

## 2021-03-10 PROCEDURE — 1126F PR PAIN SEVERITY QUANTIFIED, NO PAIN PRESENT: ICD-10-PCS | Mod: S$GLB,,, | Performed by: FAMILY MEDICINE

## 2021-03-10 PROCEDURE — 90715 TDAP VACCINE 7 YRS/> IM: CPT | Mod: S$GLB,,, | Performed by: FAMILY MEDICINE

## 2021-04-28 ENCOUNTER — PATIENT MESSAGE (OUTPATIENT)
Dept: RESEARCH | Facility: HOSPITAL | Age: 41
End: 2021-04-28

## 2021-08-16 ENCOUNTER — PATIENT MESSAGE (OUTPATIENT)
Dept: UROLOGY | Facility: CLINIC | Age: 41
End: 2021-08-16

## 2021-09-06 ENCOUNTER — PATIENT MESSAGE (OUTPATIENT)
Dept: UROLOGY | Facility: CLINIC | Age: 41
End: 2021-09-06

## 2021-09-16 ENCOUNTER — OFFICE VISIT (OUTPATIENT)
Dept: UROLOGY | Facility: CLINIC | Age: 41
End: 2021-09-16
Payer: COMMERCIAL

## 2021-09-16 VITALS
HEIGHT: 67 IN | DIASTOLIC BLOOD PRESSURE: 80 MMHG | WEIGHT: 219.13 LBS | BODY MASS INDEX: 34.39 KG/M2 | HEART RATE: 87 BPM | SYSTOLIC BLOOD PRESSURE: 136 MMHG

## 2021-09-16 DIAGNOSIS — N50.812 LEFT TESTICULAR PAIN: Primary | ICD-10-CM

## 2021-09-16 PROCEDURE — 99202 PR OFFICE/OUTPT VISIT, NEW, LEVL II, 15-29 MIN: ICD-10-PCS | Mod: S$GLB,,, | Performed by: NURSE PRACTITIONER

## 2021-09-16 PROCEDURE — 3075F SYST BP GE 130 - 139MM HG: CPT | Mod: CPTII,S$GLB,, | Performed by: NURSE PRACTITIONER

## 2021-09-16 PROCEDURE — 3079F PR MOST RECENT DIASTOLIC BLOOD PRESSURE 80-89 MM HG: ICD-10-PCS | Mod: CPTII,S$GLB,, | Performed by: NURSE PRACTITIONER

## 2021-09-16 PROCEDURE — 87086 URINE CULTURE/COLONY COUNT: CPT | Performed by: NURSE PRACTITIONER

## 2021-09-16 PROCEDURE — 99202 OFFICE O/P NEW SF 15 MIN: CPT | Mod: S$GLB,,, | Performed by: NURSE PRACTITIONER

## 2021-09-16 PROCEDURE — 3008F BODY MASS INDEX DOCD: CPT | Mod: CPTII,S$GLB,, | Performed by: NURSE PRACTITIONER

## 2021-09-16 PROCEDURE — 1160F RVW MEDS BY RX/DR IN RCRD: CPT | Mod: CPTII,S$GLB,, | Performed by: NURSE PRACTITIONER

## 2021-09-16 PROCEDURE — 3008F PR BODY MASS INDEX (BMI) DOCUMENTED: ICD-10-PCS | Mod: CPTII,S$GLB,, | Performed by: NURSE PRACTITIONER

## 2021-09-16 PROCEDURE — 99999 PR PBB SHADOW E&M-EST. PATIENT-LVL III: ICD-10-PCS | Mod: PBBFAC,,, | Performed by: NURSE PRACTITIONER

## 2021-09-16 PROCEDURE — 3079F DIAST BP 80-89 MM HG: CPT | Mod: CPTII,S$GLB,, | Performed by: NURSE PRACTITIONER

## 2021-09-16 PROCEDURE — 1160F PR REVIEW ALL MEDS BY PRESCRIBER/CLIN PHARMACIST DOCUMENTED: ICD-10-PCS | Mod: CPTII,S$GLB,, | Performed by: NURSE PRACTITIONER

## 2021-09-16 PROCEDURE — 99999 PR PBB SHADOW E&M-EST. PATIENT-LVL III: CPT | Mod: PBBFAC,,, | Performed by: NURSE PRACTITIONER

## 2021-09-16 PROCEDURE — 1159F PR MEDICATION LIST DOCUMENTED IN MEDICAL RECORD: ICD-10-PCS | Mod: CPTII,S$GLB,, | Performed by: NURSE PRACTITIONER

## 2021-09-16 PROCEDURE — 3075F PR MOST RECENT SYSTOLIC BLOOD PRESS GE 130-139MM HG: ICD-10-PCS | Mod: CPTII,S$GLB,, | Performed by: NURSE PRACTITIONER

## 2021-09-16 PROCEDURE — 1159F MED LIST DOCD IN RCRD: CPT | Mod: CPTII,S$GLB,, | Performed by: NURSE PRACTITIONER

## 2021-09-16 RX ORDER — CIPROFLOXACIN 500 MG/1
500 TABLET ORAL EVERY 12 HOURS
Qty: 14 TABLET | Refills: 0 | Status: SHIPPED | OUTPATIENT
Start: 2021-09-16 | End: 2021-09-23

## 2021-09-17 LAB — BACTERIA UR CULT: NO GROWTH

## 2024-05-09 ENCOUNTER — PATIENT OUTREACH (OUTPATIENT)
Dept: ADMINISTRATIVE | Facility: HOSPITAL | Age: 44
End: 2024-05-09
Payer: COMMERCIAL

## 2024-05-09 NOTE — PROGRESS NOTES
[facility] panel list reviewed. Patient last seen by PCP on 3/10/2021. Per spouse, patient moved to [place]    Dr. Quintanilla removed as PCP

## 2024-09-19 ENCOUNTER — PATIENT MESSAGE (OUTPATIENT)
Dept: PRIMARY CARE CLINIC | Facility: CLINIC | Age: 44
End: 2024-09-19
Payer: COMMERCIAL

## (undated) DEVICE — MARKER SKIN STND TIP BLUE BARR

## (undated) DEVICE — SYR 30CC LUER LOCK

## (undated) DEVICE — SUT 0 VICRYL / UR6 (J603)

## (undated) DEVICE — SEE MEDLINE ITEM 157117

## (undated) DEVICE — STRAP SECURE 5MM

## (undated) DEVICE — BANDAGE ADHESIVE

## (undated) DEVICE — BLADE SURG CARBON STEEL SZ11

## (undated) DEVICE — TUBING HF INSUFFLATION W/ FLTR

## (undated) DEVICE — NDL HYPO REG 25G X 1 1/2

## (undated) DEVICE — Device

## (undated) DEVICE — WARMER DRAPE STERILE LF

## (undated) DEVICE — ELECTRODE REM PLYHSV RETURN 9

## (undated) DEVICE — TRAY MINOR GEN SURG

## (undated) DEVICE — APPLICATOR CHLORAPREP ORN 26ML

## (undated) DEVICE — SUT GUT PL. 4-0 27 FS-2

## (undated) DEVICE — TRAY FOLEY 16FR INFECTION CONT